# Patient Record
Sex: FEMALE | Race: WHITE | URBAN - METROPOLITAN AREA
[De-identification: names, ages, dates, MRNs, and addresses within clinical notes are randomized per-mention and may not be internally consistent; named-entity substitution may affect disease eponyms.]

---

## 2017-06-03 ENCOUNTER — HOSPITAL ENCOUNTER (EMERGENCY)
Facility: CLINIC | Age: 22
Discharge: HOME | End: 2017-06-03
Attending: PHYSICIAN ASSISTANT | Admitting: PHYSICIAN ASSISTANT
Payer: COMMERCIAL

## 2017-06-03 VITALS
BODY MASS INDEX: 40.18 KG/M2 | TEMPERATURE: 99 F | WEIGHT: 287 LBS | DIASTOLIC BLOOD PRESSURE: 70 MMHG | RESPIRATION RATE: 14 BRPM | HEIGHT: 71 IN | OXYGEN SATURATION: 96 % | SYSTOLIC BLOOD PRESSURE: 122 MMHG | HEART RATE: 85 BPM

## 2017-06-03 DIAGNOSIS — L05.91 PILONIDAL CYST: ICD-10-CM

## 2017-06-03 PROCEDURE — 99282 EMERGENCY DEPT VISIT SF MDM: CPT

## 2017-06-03 ASSESSMENT — ENCOUNTER SYMPTOMS
ABDOMINAL PAIN: 0
VOMITING: 0
FEVER: 0

## 2017-06-03 NOTE — ED AVS SNAPSHOT
Emergency Department    [address] [facility] [address]    Phone:  [phone]    Fax:  [phone]                                       Jeramy Ramos   MRN: [id number]    Department:   Emergency Department   Date of Visit:  6/3/2017           Patient Information     Date Of Birth          1995        Your diagnoses for this visit were:     Pilonidal cyst        You were seen by Mariana Crockett PA-C.      Follow-up Information     Follow up with Ezio Patel MD.    Specialty:  Colon and Rectal Surgery    Contact information:    [facility]  [address] [address] [phone]          Follow up with  Emergency Department.    Specialty:  EMERGENCY MEDICINE    Why:  If symptoms worsen    Contact information:    [address] [facility] [address] [phone]        Discharge Instructions       Use Acetaminophen and/or Ibuprofen as needed for pain.      Do 15-20 min warm baths 2-3 times per day.     You can use ice for comfort.     Follow-up with surgery for further management.     Return to the ED with worsening pain, swelling, fevers or if you become worse in any way.           Pilonidal Cyst (Not Infected)  A pilonidal cyst is a swelling that starts under the skin on the sacrum near the tailbox. It is present at birth and may look like a small dimple. It can fill with skin oils, hair, and dead skin cells, and it may stay small or grow larger. Because it often has an opening to the surface, it may become infected with normal skin bacteria. A pilonidal cysts is different than a hemorrhoid.  Causes  The cause of pilonidal cysts has been debated since they were first recognized. It may be present at birth and go unnoticed. It may appear like a small dimple. Injury, rubbing, or skin irritation may also cause pilonidal cysts. It can also be caused by an ingrown hair. Most likely, the cause is a combination of these things.  Because some injury or irritation can lead to pilonidal cysts, it can be more common in people who sit or drive a lot for work.  Symptoms  A pilonidal cyst may be small and painless. If symptoms occur, they may include:    Swelling    Irritation or redness    Pain    Drainage  The cyst can swell and drain on its own. The swelling and drainage can come and go.  Treatment  If a pilonidal cyst isn't causing you any problems, no treatment is needed. If it comes and goes, is bothering you, or becomes infected, treatment is needed. Sometimes surgery is needed to fix it. If it becomes infected, treatment is needed.  Home care  The following guidelines will help you care for your wound at home:    Keep the area of the cyst clean by bathing or showering daily.    Avoid tight fitting clothing in the area of the cyst.    Don't try to squeeze the cyst or stick a needle in it to make it drain. It may feel better at first, but wit will make it worse and likely cause infection.    Watch for signs of infection listed below.  Follow-up care  Follow up with your doctor or as advised by our staff.  When to seek medical care  Get prompt medical attention if any of the following occur:    Pus coming from the cyst    Increasing local pain, redness or swelling    Fever over 100.4 F (38.0 C) for more than 2 days    0208-3142 [employer]. [address] [place], [place] [address]. All rights reserved. This information is not intended as a substitute for professional medical care. Always follow your healthcare professional's instructions.          24 Hour Appointment Hotline       To make an appointment at any [facility], call [phone] ([phone]). If you don't have a family doctor or clinic, we will help you find one. [facility] are conveniently located to serve the needs of you and your family.             Review of your medicines      Notice     You have not been prescribed any medications.             Orders Needing Specimen Collection     None      Pending Results     No orders found from 6/1/2017 to 6/4/2017.            Pending Culture Results     No orders found from 6/1/2017 to 6/4/2017.            Pending Results Instructions     If you had any lab results that were not finalized at the time of your Discharge, you can call the ED Lab Result RN at [phone]. You will be contacted by this team for any positive Lab results or changes in treatment. The nurses are available 7 days a week from 10A to 6:30P.  You can leave a message 24 hours per day and they will return your call.        Test Results From Your Hospital Stay               Clinical Quality Measure: Blood Pressure Screening     Your blood pressure was checked while you were in the emergency department today. The last reading we obtained was  BP: 122/70 . Please read the guidelines below about what these numbers mean and what you should do about them.  If your systolic blood pressure (the top number) is less than 120 and your diastolic blood pressure (the bottom number) is less than 80, then your blood pressure is normal. There is nothing more that you need to do about it.  If your systolic blood pressure (the top number) is 120-139 or your diastolic blood pressure (the bottom number) is 80-89, your blood pressure may be higher than it should be. You should have your blood pressure rechecked within a year by a primary care provider.  If your systolic blood pressure (the top number) is 140 or greater or your diastolic blood pressure (the bottom number) is 90 or greater, you may have high blood pressure. High blood pressure is treatable, but if left untreated over time it can put you at risk for heart attack, stroke, or kidney failure. You should have your blood pressure rechecked by a primary care provider within the next 4 weeks.  If your provider in the emergency department today gave you specific instructions to follow-up with your doctor or  "provider even sooner than that, you should follow that instruction and not wait for up to 4 weeks for your follow-up visit.        Thank you for choosing [facility]       Thank you for choosing [facility] for your care. Our goal is always to provide you with excellent care. Hearing back from our patients is one way we can continue to improve our services. Please take a few minutes to complete the written survey that you may receive in the mail after you visit with us. Thank you!        [employer] Information     [facility] lets you send messages to your doctor, view your test results, renew your prescriptions, schedule appointments and more. To sign up, go to [email or web address] . Click on \"Log in\" on the left side of the screen, which will take you to the Welcome page. Then click on \"Sign up Now\" on the right side of the page.     You will be asked to enter the access code listed below, as well as some personal information. Please follow the directions to create your username and password.     Your access code is: [email or web address]  Expires: 2017  3:37 PM     Your access code will  in 90 days. If you need help or a new code, please call your [facility] clinic or [phone].        Care EveryWhere ID     This is your Care EveryWhere ID. This could be used by other organizations to access your [facility] medical records  [id number]        After Visit Summary       This is your record. Keep this with you and show to your community pharmacist(s) and doctor(s) at your next visit.                  "

## 2017-06-03 NOTE — ED PROVIDER NOTES
"  History     Chief Complaint:  Cyst     HPI   Jeramy Ramos is a 22 year old male who presents to the emergency department today for evaluation of cyst. The patient had a pilonidal cyst surgically removed 8 years ago. The cyst redeveloped several years ago with intermittent discomfort and drainage, but never requiring I and D. The cyst flared again 2 weeks ago with moderate amount of intermittent bleeding and pus drainage. He had a larger amount of bleeding this afternoon so presents to the ED for evaluation. He reports some upper gluteal cleft pain with sitting but otherwise has minimal discomfort. He denies any abdominal pain, fever, or vomiting, numbness/ tingling or any other complications or concerns.      Allergies:  No Known Drug Allergies    Medications:    The patient is currently on no regular medications.    Past Medical History:    History reviewed. No pertinent past medical history.    Past Surgical History:    Pilonidal cyst removal     Family History:    History reviewed. No pertinent family history.     Social History:  The patient was accompanied to the ED by mother.  Smoking Status: Current some day smoker  Smokeless Tobacco: No  Alcohol Use: Yes     Review of Systems   Constitutional: Negative for fever.   Gastrointestinal: Negative for abdominal pain and vomiting.   Musculoskeletal:        + gluteal cleft pain   Skin: Positive for wound.   Neurological: Negative for numbness.   All other systems reviewed and are negative.    Physical Exam   Vitals:  Patient Vitals for the past 24 hrs:   BP Temp Temp src Pulse Resp SpO2 Height Weight   06/03/17 1450 127/77 99  F (37.2  C) Oral 85 18 98 % 1.803 m (5' 11\") 130.2 kg (287 lb)       Physical Exam  .General: Resting comfortably on the gurney.    Resp:  Non-labored breathing. No tachypnea.     Lung fields clear to auscultation without wheezes or rales.   CV:  Regular rate and rhythm. Normal S1 and S2, no S3 or S4.     No pathological murmur " detected.   GI:  Abdomen is soft and non-distended.     Non-tender to palpation in all four quadrants.    MS:  Normal muscular tone.   Neuro:  Awake and alert. Speech is clear.   Skin:  Right side of superior gluteal cleft there is a 1 cm in diameter areas of tenderness with a 0.2 cm open and draining wound. The drainage is bloody with a small amount of purulence. No surrounding erythema, fluctuance or crepitus.   Psych: Normal affect. Appropriate interactions.     Emergency Department Course   Emergency Department Course:  Nursing notes and vitals reviewed.  I performed an exam of the patient as documented above.     I discussed the treatment plan with the patient. They expressed understanding of this plan and consented to discharge.    I personally reviewed the laboratory results with the Patient and answered all related questions prior to discharge.    Impression & Plan      Medical Decision Making:  Jeramy Ramos is a 22 year old female who presents with upper gluteal cleft pain. Pt has signs of an pilonidal cyst. I&D was not performed because the wound was already open and draining. Pressure was applied and a moderate amount of blood/ minimally purulent drainage was expressed. With active drainage and no fluctuance, I did not feel further I and D was indicated at this time. No signs of serious infection like necrotizing fascitis or overlying cellulitis, no crepitance to tissues, no sensation change to tissues.  Will need wound cares once per day.  Plan home with follow-up of surgery or primary; may return to ED for wound check if cannot arrange. Warning signs for wound given on discharge instructions and verbally. I discussed the results, plan and any additional questions with the patient and his mother. They verbalized understanding and agreement with the plan.        Diagnosis:    ICD-10-CM    1. Pilonidal cyst L05.91          Disposition:   The patient was discharged.      Scribe Disclosure:  Vilma LOPEZ  Kiran, am serving as a scribe at 3:02 PM on 6/3/2017 to document services personally performed by Mariana Crockett PA-C, based on my observations and the provider's statements to me.    6/3/2017    EMERGENCY DEPARTMENT       Mariana Crockett PA-C  06/04/17 1036

## 2017-06-03 NOTE — ED AVS SNAPSHOT
Emergency Department    [address] [facility] [address]    Phone:  [phone]    Fax:  [phone]                                       Jeramy Ramos   MRN: [id number]    Department:   Emergency Department   Date of Visit:  6/3/2017           After Visit Summary Signature Page     I have received my discharge instructions, and my questions have been answered. I have discussed any challenges I see with this plan with the nurse or doctor.    ..........................................................................................................................................  Patient/Patient Representative Signature      ..........................................................................................................................................  Patient Representative Print Name and Relationship to Patient    ..................................................               ................................................  Date                                            Time    ..........................................................................................................................................  Reviewed by Signature/Title    ...................................................              ..............................................  Date                                                            Time

## 2017-06-03 NOTE — LETTER
EMERGENCY DEPARTMENT  [address] [facility] [address]  [phone]    Karen 3, 2017    Jeramy Ramos  [address] [facility] [address]  [phone] (home)     : 1995      To Whom it may concern:    Jeramy Ramos was seen in our Emergency Department today, Karen 3, 2017.  I expect her condition to improve over the next 1-3 days.  She may return to work when improved.    Sincerely,        Mariana Crockett

## 2017-06-03 NOTE — DISCHARGE INSTRUCTIONS
Use Acetaminophen and/or Ibuprofen as needed for pain.      Do 15-20 min warm baths 2-3 times per day.     You can use ice for comfort.     Follow-up with surgery for further management.     Return to the ED with worsening pain, swelling, fevers or if you become worse in any way.           Pilonidal Cyst (Not Infected)  A pilonidal cyst is a swelling that starts under the skin on the sacrum near the tailbox. It is present at birth and may look like a small dimple. It can fill with skin oils, hair, and dead skin cells, and it may stay small or grow larger. Because it often has an opening to the surface, it may become infected with normal skin bacteria. A pilonidal cysts is different than a hemorrhoid.  Causes  The cause of pilonidal cysts has been debated since they were first recognized. It may be present at birth and go unnoticed. It may appear like a small dimple. Injury, rubbing, or skin irritation may also cause pilonidal cysts. It can also be caused by an ingrown hair. Most likely, the cause is a combination of these things. Because some injury or irritation can lead to pilonidal cysts, it can be more common in people who sit or drive a lot for work.  Symptoms  A pilonidal cyst may be small and painless. If symptoms occur, they may include:    Swelling    Irritation or redness    Pain    Drainage  The cyst can swell and drain on its own. The swelling and drainage can come and go.  Treatment  If a pilonidal cyst isn't causing you any problems, no treatment is needed. If it comes and goes, is bothering you, or becomes infected, treatment is needed. Sometimes surgery is needed to fix it. If it becomes infected, treatment is needed.  Home care  The following guidelines will help you care for your wound at home:    Keep the area of the cyst clean by bathing or showering daily.    Avoid tight fitting clothing in the area of the cyst.    Don't try to squeeze the cyst or stick a needle in it to make it drain. It may  feel better at first, but wit will make it worse and likely cause infection.    Watch for signs of infection listed below.  Follow-up care  Follow up with your doctor or as advised by our staff.  When to seek medical care  Get prompt medical attention if any of the following occur:    Pus coming from the cyst    Increasing local pain, redness or swelling    Fever over 100.4 F (38.0 C) for more than 2 days    7063-7160 [employer]. [address] [place] [address], [place] [address]. All rights reserved. This information is not intended as a substitute for professional medical care. Always follow your healthcare professional's instructions.

## 2017-06-04 ASSESSMENT — ENCOUNTER SYMPTOMS
NUMBNESS: 0
WOUND: 1